# Patient Record
Sex: MALE | Race: WHITE | NOT HISPANIC OR LATINO | Employment: FULL TIME | URBAN - NONMETROPOLITAN AREA
[De-identification: names, ages, dates, MRNs, and addresses within clinical notes are randomized per-mention and may not be internally consistent; named-entity substitution may affect disease eponyms.]

---

## 2019-11-20 NOTE — PROGRESS NOTES
Name:  Lisandro Hernandez  YOB: 1985  Location: [facility]  Location Address: [address] [place] [address], [facility] [address], [address] [place] [address]  Location Phone: [phone]    Chief Complaint  Chief Complaint   Patient presents with   • Skin Lesion     scalp   esion of t    History of Present Illness  The patient  is a 34 y.o. male who is referred by Dixie Daley MD for preoperative evaluation. He complains of a lesion of the right superior occipital scalp present for 18 year(s). It has not been  biopsied.      He has had it since he was approximately 16 years old and occasionally it drains and becomes inflamed and bothersome.         Past Medical History:   Diagnosis Date   • Asthma        Past Surgical History:   Procedure Laterality Date   • DENTAL PROCEDURE         No current outpatient medications on file.    Patient has no known allergies.    Family History   Adopted: Yes       Social History     Socioeconomic History   • Marital status: Single     Spouse name: Not on file   • Number of children: Not on file   • Years of education: Not on file   • Highest education level: Not on file   Tobacco Use   • Smoking status: Current Every Day Smoker     Packs/day: 1.00     Types: Cigarettes     Start date:    • Smokeless tobacco: Never Used   Substance and Sexual Activity   • Alcohol use: No   • Drug use: No   • Sexual activity: Defer       Review of Systems   Constitutional: Negative.    HENT: Negative.    Eyes: Negative.    Respiratory: Negative.    Cardiovascular: Negative.    Gastrointestinal: Negative.    Endocrine: Negative.    Genitourinary: Negative.    Musculoskeletal: Negative.    Skin:        lesion of the right superior occipital scalp   Allergic/Immunologic: Negative.    Neurological: Negative.    Hematological: Negative.    Psychiatric/Behavioral: Negative.        Vitals:    19 1022   BP: 140/98   Pulse: 98   Temp: 98.2 °F (36.8 °C)       Objective     Physical  Exam  CONSTITUTIONAL: well nourished, well-developed, alert, oriented, in no acute distress     COMMUNICATION AND VOICE: able to communicate normally, normal voice quality    HEAD: normocephalic, 1.4 x 1.2 cm cystic papule with central area of excoriation mid central occipital scalp, atraumatic, no tenderness, no masses     FACE: appearance normal, no lesions, no tenderness, no deformities, facial motion symmetric    EYES: ocular motility normal, eyelids normal, orbits normal, no proptosis, conjunctiva normal , pupils equal, round     EARS:  Hearing: hearing to conversational voice intact bilaterally   External Ears: normal bilaterally, no lesions    NOSE:  External Nose: external nasal structure normal, no tenderness on palpation, no nasal discharge, no lesions, no evidence of trauma, nostrils patent     ORAL:  Lips: upper and lower lips without lesion     NECK:  Inspection and Palpation: neck appearance normal, no masses or tenderness    CHEST/RESPIRATORY: normal respiratory effort     CARDIOVASCULAR: no cyanosis or edema     NEUROLOGICAL/PSYCHIATRIC: oriented to time, place and person, mood normal, affect appropriate, CN II-XII intact grossly      Assessment/Plan   Lisandro was seen today for skin lesion.    Diagnoses and all orders for this visit:    Neoplasm of uncertain behavior  Comments:  Probable follicular cyst  Orders:  -     Case Request; Standing  -     Comprehensive Metabolic Panel; Future  -     CBC and Differential; Future  -     ECG 12 Lead; Future  -     XR Chest 2 View; Future    Other orders  -     Follow Anesthesia Guidelines / Standing Orders; Future  -     Obtain Informed Consent  -     Follow Anesthesia Guidelines / Standing Orders; Standing  -     Verify NPO Status; Standing  -     Obtain Informed Consent; Standing  -     SCD (Sequential Compression Device) - To Be Placed on Patient in Pre-Op; Standing  -     NPO Diet; Standing      Excision of neoplasm of uncertain origin of the mid central  "scalp with possible flap or graft (N/A)  Orders Placed This Encounter   Procedures   • XR Chest 2 View     Standing Status:   Future     Standing Expiration Date:   11/20/2020     Order Specific Question:   Reason for Exam:     Answer:   Excision of neoplasm of uncertain origin of the mid central scalp with possible flap or graft   • Comprehensive Metabolic Panel     Standing Status:   Future     Standing Expiration Date:   11/20/2020   • Follow Anesthesia Guidelines / Standing Orders     Standing Status:   Future   • Obtain Informed Consent     EXCISION LESION with possible flap or graft-     Order Specific Question:   Informed Consent Given For     Answer:   Excision of neoplasm of uncertain origin of the mid central scalp with possible flap or graft   • ECG 12 Lead     Standing Status:   Future     Standing Expiration Date:   11/20/2020     Order Specific Question:   Reason for Exam:     Answer:   EXCISION LESION   • CBC and Differential     Standing Status:   Future     Standing Expiration Date:   11/20/2020     Order Specific Question:   Manual Differential     Answer:   No     Return for postop.       Patient Instructions   Excision of neoplasm of uncertain behavior of the mid central posterior scalp  The risks, benefits and options of the procedure were explained to the patient. The possiblity of a persistent cosmetic defect as well as a \"flap\" or a graft to close the defect was discussed. The patient was instructed to stop all aspirin, NSAIDs and VIT E etc.  If appropriate, clearance with primary MD or specialist will be obtained preoperatively.  The patient was scheduled for a LOCAL/MAC Anesthesia with permanent pathologic examination..    For instructions on the proper use, care and disposal of controled substances, ask you doctor or refer to the [place] Board of Medicine website: http://kbml.[place].gov/hb1/Pages/Considerations-For-Patient-Education.aspx      For more information:  Quit Now " [place]  1-800-QUIT-NOW  https://[place].quitlogix.org/en-US/

## 2019-11-21 ENCOUNTER — OFFICE VISIT (OUTPATIENT)
Dept: OTOLARYNGOLOGY | Facility: CLINIC | Age: 34
End: 2019-11-21

## 2019-11-21 VITALS
HEART RATE: 98 BPM | HEIGHT: 66 IN | SYSTOLIC BLOOD PRESSURE: 140 MMHG | TEMPERATURE: 98.2 F | WEIGHT: 215.8 LBS | DIASTOLIC BLOOD PRESSURE: 98 MMHG | BODY MASS INDEX: 34.68 KG/M2

## 2019-11-21 DIAGNOSIS — D48.9 NEOPLASM OF UNCERTAIN BEHAVIOR: Primary | ICD-10-CM

## 2019-11-21 PROCEDURE — 99203 OFFICE O/P NEW LOW 30 MIN: CPT | Performed by: OTOLARYNGOLOGY

## 2019-11-21 NOTE — PATIENT INSTRUCTIONS
"Excision of neoplasm of uncertain behavior of the mid central posterior scalp  The risks, benefits and options of the procedure were explained to the patient. The possiblity of a persistent cosmetic defect as well as a \"flap\" or a graft to close the defect was discussed. The patient was instructed to stop all aspirin, NSAIDs and VIT E etc.  If appropriate, clearance with primary MD or specialist will be obtained preoperatively.  The patient was scheduled for a LOCAL/MAC Anesthesia with permanent pathologic examination..    For instructions on the proper use, care and disposal of controled substances, ask you doctor or refer to the [place] Board of Medicine website: http://kbml.[place].gov/hb1/Pages/Considerations-For-Patient-Education.aspx      For more information:  Quit Now [facility]  1-800-QUIT-NOW  https://[email or web address].quitlogix.org/en-US/       "

## 2020-01-10 ENCOUNTER — TELEPHONE (OUTPATIENT)
Dept: OTOLARYNGOLOGY | Facility: CLINIC | Age: 35
End: 2020-01-10

## 2025-01-21 ENCOUNTER — TELEPHONE (OUTPATIENT)
Dept: OTOLARYNGOLOGY | Facility: CLINIC | Age: 40
End: 2025-01-21
Payer: COMMERCIAL

## 2025-02-18 ENCOUNTER — OFFICE VISIT (OUTPATIENT)
Dept: OTOLARYNGOLOGY | Facility: CLINIC | Age: 40
End: 2025-02-18
Payer: COMMERCIAL

## 2025-02-18 VITALS
WEIGHT: 189.4 LBS | DIASTOLIC BLOOD PRESSURE: 95 MMHG | SYSTOLIC BLOOD PRESSURE: 148 MMHG | TEMPERATURE: 98.8 F | BODY MASS INDEX: 30.44 KG/M2 | HEART RATE: 101 BPM | HEIGHT: 66 IN

## 2025-02-18 DIAGNOSIS — R06.83 SNORING: ICD-10-CM

## 2025-02-18 DIAGNOSIS — J35.8 TONSIL STONE: ICD-10-CM

## 2025-02-18 DIAGNOSIS — J35.1 ENLARGED TONSILS: Primary | ICD-10-CM

## 2025-02-18 RX ORDER — LISINOPRIL 20 MG/1
1 TABLET ORAL 2 TIMES DAILY
COMMUNITY

## 2025-02-18 RX ORDER — DIVALPROEX SODIUM 125 MG/1
1 TABLET, DELAYED RELEASE ORAL 2 TIMES DAILY
COMMUNITY

## 2025-02-18 RX ORDER — TADALAFIL 20 MG/1
1 TABLET ORAL DAILY
COMMUNITY
Start: 2024-12-21

## 2025-02-18 RX ORDER — LISDEXAMFETAMINE DIMESYLATE 20 MG/1
1 CAPSULE ORAL DAILY
COMMUNITY

## 2025-02-18 RX ORDER — FLUOXETINE 20 MG/1
1 TABLET, FILM COATED ORAL DAILY
COMMUNITY
Start: 2025-01-23

## 2025-02-18 NOTE — PROGRESS NOTES
BONNIE Tolbert  [facility]  [address], [address]  [facility] [address]  Fax [phone]  Phone [phone]      Visit Type: NEW PATIENT   Chief Complaint   Patient presents with    enlarged tonsils           HISTORY OBTAINED FROM: patient  HPI  Lisandro Hernandez is a 39 y.o. male who presents for evaluation of enlarged tonsils. Reports he has issues for years, has 2-3 episodes of strep per year requiring antibiotics but often has swelling in tonsils throughout the year. Feels he has some trouble swallowing occasional when he has swelling. Occasional hoarseness or vocal changes. Does report he snores heavily and has some general sleep disturbance, never had sleep study. Does report recurrent tonsil stones. He is interested in having tonsillectomy.     Past Medical History:   Diagnosis Date    Asthma        Past Surgical History:   Procedure Laterality Date    DENTAL PROCEDURE         Family History: His family history is not on file. He was adopted.     Social History: He  reports that he has been smoking cigarettes. He started smoking about 18 years ago. He has a 18.1 pack-year smoking history. He has never used smokeless tobacco. He reports that he does not drink alcohol and does not use drugs.    Home Medications:  FLUoxetine, divalproex, lisdexamfetamine, lisinopril, and tadalafil    Allergies:  He has No Known Allergies.       Vital Signs:   Temp:  [98.8 °F (37.1 °C)] 98.8 °F (37.1 °C)  Heart Rate:  [101] 101  BP: (148)/(95) 148/95  ENT Physical Exam  Constitutional  Appearance: patient appears well-developed, well-nourished and well-groomed,  Communication/Voice: communication appropriate for developmental age; vocal quality normal;  Head and Face  Appearance: head appears normal, face appears normal and face appears atraumatic;  Palpation: facial palpation normal;  Ear  Hearing: intact;  Auricles: bilateral auricles  normal;  External Mastoids: bilateral external mastoids normal;  Ear Canals: bilateral ear canals normal;  Tympanic Membranes: bilateral tympanic membranes normal;  Nose  External Nose: nares patent bilaterally; external nose normal;  Oral Cavity/Oropharynx  Lips: normal;  Teeth: normal;  Gums: gingiva normal;  Tongue: normal;  Oral mucosa: normal;  Hard palate: normal;  Soft palate: normal;  Tonsils: bilateral tonsils 2+, cryptic;  Base of Tongue: normal;  Posterior pharyngeal wall: normal;  Neck  Neck: neck normal; neck palpation normal;  Respiratory  Inspection: breathing unlabored; normal breathing rate;  Cardiovascular  Inspection: extremities are warm and well perfused;  Neurovestibular  Mental Status: alert and oriented;               Result Review       RESULTS REVIEW    I have reviewed the patients old records in the chart.   The following results/records were reviewed:     PROGRESS NOTES - SCAN - PN_BONNIE MANTILLA_11/12/24 (11/12/2024)          Assessment & Plan  Enlarged tonsils    Snoring    Tonsil stone                  Medical and surgical options were discussed including medical and surgical options. Risks, benefits and alternatives were discussed and questions were answered. After considering the options, the patient decided to proceed with surgery.     -----SURGERY SCHEDULING:-----  Schedule TONSILLECTOMY AND ADENOIDECTOMY (N/A)    ---INFORMED CONSENT DISCUSSION:---  TONSILLECTOMY AND ADENOIDECTOMY: A tonsillectomy and adenoidectomy were recommended. The risks and benefits were explained including but not limited to early and late bleeding, infection, risks of the general anesthesia, dysphagia and poor PO intake, and voice change/VPI.  Alternatives were discussed. The patient/parents understood these risks and wanted to proceed. Questions were asked appropriately answered.      ---PREOPERATIVE WORKUP:---  labs/ workup per anesthesia    Call with questions or concerns    Return for 4 week nurse  call post op.        Electronically signed by BONNIE Tolbert 02/18/25 10:28 AM CST.